# Patient Record
Sex: FEMALE | Race: WHITE
[De-identification: names, ages, dates, MRNs, and addresses within clinical notes are randomized per-mention and may not be internally consistent; named-entity substitution may affect disease eponyms.]

---

## 2017-12-24 ENCOUNTER — HOSPITAL ENCOUNTER (EMERGENCY)
Dept: HOSPITAL 72 - EMR | Age: 42
Discharge: HOME | End: 2017-12-24
Payer: COMMERCIAL

## 2017-12-24 VITALS — DIASTOLIC BLOOD PRESSURE: 71 MMHG | SYSTOLIC BLOOD PRESSURE: 112 MMHG

## 2017-12-24 VITALS — BODY MASS INDEX: 25.52 KG/M2 | WEIGHT: 130 LBS | HEIGHT: 60 IN

## 2017-12-24 DIAGNOSIS — J20.9: Primary | ICD-10-CM

## 2017-12-24 PROCEDURE — 94664 DEMO&/EVAL PT USE INHALER: CPT

## 2017-12-24 PROCEDURE — 99283 EMERGENCY DEPT VISIT LOW MDM: CPT

## 2017-12-24 PROCEDURE — 94640 AIRWAY INHALATION TREATMENT: CPT

## 2017-12-24 NOTE — EMERGENCY ROOM REPORT
History of Present Illness


General


Chief Complaint:  Pain


Source:  Patient





Present Illness


HPI


41 YO Female presents to the ED c/o nasal congestion/stuffy nose with 

persistent cough  x 4 days. pt. states her cough is worse at night . denies 

acid reflux. denies fevers or chills. reports hx of allergies otherwise denies 

asthma. reports that her voice has been "raspy". pt. denies recent travel or 

ill contacts. denies LE edema.  Denies CP, Palpitations, LOC, AMS, dizziness, 

Changes in Vision, Sensation, paresthesias, or a sudden severe headache.


Allergies:  


Coded Allergies:  


     No Known Allergies (Unverified , 2/9/15)





Patient History


Past Medical History:  see triage record


Past Surgical History:  none


Pertinent Family History:  none


Last Menstrual Period:  Nine days ago


Pregnant Now:  No


Reviewed Nursing Documentation:  PMH: Agreed, PSxH: Agreed





Review of Systems


All Other Systems:  negative except mentioned in HPI





Physical Exam





Vital Signs








  Date Time  Temp Pulse Resp B/P (MAP) Pulse Ox O2 Delivery O2 Flow Rate FiO2


 


12/24/17 15:18 98.1 80 16 116/72 99 Room Air  








Sp02 EP Interpretation:  reviewed, normal


General Appearance:  no apparent distress, alert, GCS 15, non-toxic


Head:  normocephalic, atraumatic


Eyes:  bilateral eye normal inspection, bilateral eye PERRL


ENT:  hearing grossly normal, normal pharynx, no angioedema, normal voice, TMs 

+ canals normal, uvula midline, moist mucus membranes, nasal congestion - mild 

bilateral nasal congestion with patent nares bialterally. no appreciable 

unilateral sinus ttp.


Neck:  full range of motion, no meningismus, supple/symm/no masses


Respiratory:  chest non-tender, lungs clear, normal breath sounds, speaking 

full sentences, wheezing - scant wheezing bilaterally


Cardiovascular #1:  regular rate, rhythm, no edema


Gastrointestinal:  non tender, soft


Rectal:  deferred


Musculoskeletal:  back normal, gait/station normal, normal range of motion, non-

tender


Neurologic:  alert, oriented x3, responsive, motor strength/tone normal, 

sensory intact, speech normal


Skin:  normal color, no rash, warm/dry, well hydrated


Lymphatic:  no adenopathy





Medical Decision Making


PA Attestation


 Dr. Madrid is my supervising Physician whom patient management has been 

discussed with.


Diagnostic Impression:  


 Primary Impression:  


 Cough


 Additional Impression:  


 Acute bronchitis


 Qualified Codes:  J20.9 - Acute bronchitis, unspecified


ER Course


41 YO Female presents to the ED c/o nasal congestion/stuffy nose with 

persistent cough  x 4 days. pt. states her cough is worse at night . denies 

acid reflux. denies fevers or chills. reports hx of allergies otherwise denies 

asthma. reports that her voice has been "raspy". pt. denies recent travel or 

ill contacts. denies LE edema.  Denies CP, Palpitations, LOC, AMS, dizziness, 

Changes in Vision, Sensation, paresthesias, or a sudden severe headache.





Ddx considered but are not limited to URI, pneumonia, PE, strep pharyngitis, 

meningitis.





Vital signs: Pt.is afebrile VS are WNL


H&PE are most consistent with bronchitis





ORDERS: none required at this time, the diagnosis is clinical





ED INTERVENTIONS: 





-Albuterol HHN





--d/w pt. the possibility that acid reflux may be cause of cough that is worse 

at night and laryngitis/raspy voice. 





DISCHARGE: At this time pt. is stable for d/c to home. Will provide printed 

patient care instructions, and any necessary prescriptions. Care plan and 

follow up instructions have been discussed with the patient prior to discharge.





Last Vital Signs








  Date Time  Temp Pulse Resp B/P (MAP) Pulse Ox O2 Delivery O2 Flow Rate FiO2


 


12/24/17 15:18 98.1 80 16 116/72 99 Room Air  








Disposition:  HOME, SELF-CARE


Condition:  Stable


Scripts


Albuterol Sulfate* (ALBUTEROL SULFATE MDI*) 8.5 Gm Hfa.aer.ad


2 PUFF INH Q6H, #1 INH 0 Refills


   Prov: Teresa Kilgore P.A.         12/24/17 


Ranitidine Hcl* (ZANTAC*) 150 Mg Tablet


150 MG ORAL TWICE A DAY, #30 TAB


   Prov: Teresa Kilgore P.A.         12/24/17 


Codeine/Promethazine Hcl* (PROMETHAZINE-CODEINE SYRUP*) 118 Ml Syrup


5 ML ORAL Q6H Y for For Cough, #120 ML 0 Refills


   Prov: Teresa Kilgore P.A.         12/24/17


Patient Instructions:  Acute Bronchitis, Easy-to-Read, Heartburn, Easy-to-Read, 

Shortness of Breath, Easy-to-Read





Additional Instructions:  


Take medications as directed. 


 ** Follow up with a Primary Care Provider in 3-5 days, even if your symptoms 

have resolved. ** 


--Please review list of primary care clinics, if you do not already have a 

primary care provider





Return sooner to ED if new symptoms occur, or current symptoms become worse. 


Do not drink alcohol, drive, or operate heavy machinery while taking Cough as 

this may cause drowsiness. 











- Please note that this Emergency Department Report was dictated using [employer] technology software, occasionally this can lead to 

erroneous entry secondary to interpretation by the dictation equipment.











Teresa Kilgore Dec 24, 2017 15:57

## 2018-01-09 ENCOUNTER — HOSPITAL ENCOUNTER (EMERGENCY)
Dept: HOSPITAL 72 - EMR | Age: 43
Discharge: HOME | End: 2018-01-09
Payer: COMMERCIAL

## 2018-01-09 VITALS — SYSTOLIC BLOOD PRESSURE: 118 MMHG | DIASTOLIC BLOOD PRESSURE: 72 MMHG

## 2018-01-09 VITALS — WEIGHT: 125 LBS | HEIGHT: 60.25 IN | BODY MASS INDEX: 24.22 KG/M2

## 2018-01-09 VITALS — SYSTOLIC BLOOD PRESSURE: 129 MMHG | DIASTOLIC BLOOD PRESSURE: 76 MMHG
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
## 2018-01-09 DIAGNOSIS — S00.93XA: Primary | ICD-10-CM
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
## 2018-01-09 DIAGNOSIS — Z23: ICD-10-CM

## 2018-01-09 DIAGNOSIS — S10.91XA: ICD-10-CM

## 2018-01-09 PROCEDURE — 90715 TDAP VACCINE 7 YRS/> IM: CPT

## 2018-01-09 PROCEDURE — 99283 EMERGENCY DEPT VISIT LOW MDM: CPT

## 2018-01-09 PROCEDURE — 90471 IMMUNIZATION ADMIN: CPT
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
## 2019-01-07 ENCOUNTER — HOSPITAL ENCOUNTER (EMERGENCY)
Dept: HOSPITAL 72 - EMR | Age: 44
Discharge: HOME | End: 2019-01-07
Payer: COMMERCIAL

## 2019-01-07 VITALS — HEIGHT: 60 IN | BODY MASS INDEX: 21.6 KG/M2 | WEIGHT: 110 LBS

## 2019-01-07 VITALS — DIASTOLIC BLOOD PRESSURE: 75 MMHG | SYSTOLIC BLOOD PRESSURE: 115 MMHG

## 2019-01-07 VITALS — SYSTOLIC BLOOD PRESSURE: 115 MMHG | DIASTOLIC BLOOD PRESSURE: 75 MMHG

## 2019-01-07 DIAGNOSIS — Z90.49: ICD-10-CM

## 2019-01-07 DIAGNOSIS — R11.0: ICD-10-CM

## 2019-01-07 DIAGNOSIS — R10.30: Primary | ICD-10-CM

## 2019-01-07 DIAGNOSIS — E78.5: ICD-10-CM

## 2019-01-07 LAB
ADD MANUAL DIFF: NO
ALBUMIN SERPL-MCNC: 4.3 G/DL (ref 3.4–5)
ALBUMIN/GLOB SERPL: 1 {RATIO} (ref 1–2.7)
ALP SERPL-CCNC: 70 U/L (ref 46–116)
ALT SERPL-CCNC: 15 U/L (ref 12–78)
ANION GAP SERPL CALC-SCNC: 10 MMOL/L (ref 5–15)
APPEARANCE UR: (no result)
APTT PPP: (no result) S
AST SERPL-CCNC: 15 U/L (ref 15–37)
BASOPHILS NFR BLD AUTO: 1.7 % (ref 0–2)
BILIRUB SERPL-MCNC: 0.2 MG/DL (ref 0.2–1)
BUN SERPL-MCNC: 16 MG/DL (ref 7–18)
CALCIUM SERPL-MCNC: 9.5 MG/DL (ref 8.5–10.1)
CHLORIDE SERPL-SCNC: 103 MMOL/L (ref 98–107)
CO2 SERPL-SCNC: 27 MMOL/L (ref 21–32)
CREAT SERPL-MCNC: 0.9 MG/DL (ref 0.55–1.3)
EOSINOPHIL NFR BLD AUTO: 1.1 % (ref 0–3)
ERYTHROCYTE [DISTWIDTH] IN BLOOD BY AUTOMATED COUNT: 11.1 % (ref 11.6–14.8)
GLOBULIN SER-MCNC: 4.2 G/DL
GLUCOSE UR STRIP-MCNC: NEGATIVE MG/DL
HCT VFR BLD CALC: 37.7 % (ref 37–47)
HGB BLD-MCNC: 12.9 G/DL (ref 12–16)
KETONES UR QL STRIP: (no result)
LEUKOCYTE ESTERASE UR QL STRIP: NEGATIVE
LYMPHOCYTES NFR BLD AUTO: 25.2 % (ref 20–45)
MCV RBC AUTO: 89 FL (ref 80–99)
MONOCYTES NFR BLD AUTO: 6.5 % (ref 1–10)
NEUTROPHILS NFR BLD AUTO: 65.5 % (ref 45–75)
NITRITE UR QL STRIP: NEGATIVE
PH UR STRIP: 7 [PH] (ref 4.5–8)
PLATELET # BLD: 297 K/UL (ref 150–450)
POTASSIUM SERPL-SCNC: 3.7 MMOL/L (ref 3.5–5.1)
PROT UR QL STRIP: (no result)
RBC # BLD AUTO: 4.25 M/UL (ref 4.2–5.4)
SODIUM SERPL-SCNC: 140 MMOL/L (ref 136–145)
SP GR UR STRIP: 1.01 (ref 1–1.03)
UROBILINOGEN UR-MCNC: 1 MG/DL (ref 0–1)
WBC # BLD AUTO: 6.8 K/UL (ref 4.8–10.8)

## 2019-01-07 PROCEDURE — 99284 EMERGENCY DEPT VISIT MOD MDM: CPT

## 2019-01-07 PROCEDURE — 74177 CT ABD & PELVIS W/CONTRAST: CPT

## 2019-01-07 PROCEDURE — 96361 HYDRATE IV INFUSION ADD-ON: CPT

## 2019-01-07 PROCEDURE — 83690 ASSAY OF LIPASE: CPT

## 2019-01-07 PROCEDURE — 96375 TX/PRO/DX INJ NEW DRUG ADDON: CPT

## 2019-01-07 PROCEDURE — 81025 URINE PREGNANCY TEST: CPT

## 2019-01-07 PROCEDURE — 36415 COLL VENOUS BLD VENIPUNCTURE: CPT

## 2019-01-07 PROCEDURE — 85025 COMPLETE CBC W/AUTO DIFF WBC: CPT

## 2019-01-07 PROCEDURE — 80053 COMPREHEN METABOLIC PANEL: CPT

## 2019-01-07 PROCEDURE — 96374 THER/PROPH/DIAG INJ IV PUSH: CPT

## 2019-01-07 PROCEDURE — 81003 URINALYSIS AUTO W/O SCOPE: CPT

## 2019-01-07 NOTE — EMERGENCY ROOM REPORT
History of Present Illness


General


Chief Complaint:  Abdominal Pain


Source:  Patient





Present Illness


HPI


Patient is a 43-year-old female presented after increased lower abdominal pain.

  Patient reports of increased pain periumbilical area as well as from her into 

the left lower quadrant.  Patient had prior history of cholecystectomy 

laparoscopically performed approximately 4 years ago.  She had not been 

vomiting but has been feeling increasingly nauseated.Patient works in the 

emergency department.She reportedly been having increased pain after eating.


Allergies:  


Coded Allergies:  


     No Known Allergies (Unverified , 1/7/19)





Patient History


Past Medical History:  see triage record


Last Menstrual Period:  12/31/18


Pregnant Now:  No


Reviewed Nursing Documentation:  PMH: Agreed; PSxH: Agreed





Nursing Documentation-PMH


Past Medical History:  No Stated History


Hx Cardiac Problems:  No - HYPERLIPIDEMIA





Review of Systems


All Other Systems:  negative except mentioned in HPI





Physical Exam





Vital Signs








  Date Time  Temp Pulse Resp B/P (MAP) Pulse Ox O2 Delivery O2 Flow Rate FiO2


 


1/7/19 18:50 98.2 90 16 118/82 96 Room Air  








Sp02 EP Interpretation:  reviewed, normal


General Appearance:  normal inspection, well appearing, no apparent distress, 

alert, GCS 15, non-toxic


Head:  atraumatic


ENT:  normal ENT inspection, hearing grossly normal, normal voice


Neck:  normal inspection, full range of motion, supple, no bony tend


Respiratory:  normal inspection, lungs clear, normal breath sounds, no 

respiratory distress, no retraction, no wheezing


Cardiovascular #1:  regular rate, rhythm, no edema


Gastrointestinal:  normal inspection, normal bowel sounds, non tender, soft, no 

guarding, no hernia


Genitourinary:  no CVA tenderness


Musculoskeletal:  normal inspection, back normal, normal range of motion


Neurologic:  normal inspection, alert, oriented x3, responsive, CNs III-XII nml 

as tested, speech normal


Psychiatric:  normal inspection, judgement/insight normal, mood/affect normal


Skin:  normal inspection, normal color, no rash





Medical Decision Making


ER Course


.  ..Patient presented for abdominal pain. Differential diagnoses included 

ischemic bowel, appendicitis, perforated viscus, abdominal aortic aneurysm, 

inferior myocardial infarction, viral gastroenteritis because of complexity of 

patient's case laboratory testing and imaging studies were ordered.Labs or 

studies were essentially unremarkable.  CT abdomen pelvis is ordered to the 

patient's severe pain.  CT abdomen pelvis read by radiology showed 

cholecystectomy with mild intra-hepatic duct dilation there is no evidence of 

GI or urinary tract obstruction there was a normal appendix as well as small 

fatty umbilical hernia





Labs








Test


  1/7/19


19:15 1/7/19


20:20


 


White Blood Count


  6.8 K/UL


(4.8-10.8) 


 


 


Red Blood Count


  4.25 M/UL


(4.20-5.40) 


 


 


Hemoglobin


  12.9 G/DL


(12.0-16.0) 


 


 


Hematocrit


  37.7 %


(37.0-47.0) 


 


 


Mean Corpuscular Volume 89 FL (80-99)  


 


Mean Corpuscular Hemoglobin


  30.4 PG


(27.0-31.0) 


 


 


Mean Corpuscular Hemoglobin


Concent 34.3 G/DL


(32.0-36.0) 


 


 


Red Cell Distribution Width


  11.1 %


(11.6-14.8) 


 


 


Platelet Count


  297 K/UL


(150-450) 


 


 


Mean Platelet Volume


  6.8 FL


(6.5-10.1) 


 


 


Neutrophils (%) (Auto)


  65.5 %


(45.0-75.0) 


 


 


Lymphocytes (%) (Auto)


  25.2 %


(20.0-45.0) 


 


 


Monocytes (%) (Auto)


  6.5 %


(1.0-10.0) 


 


 


Eosinophils (%) (Auto)


  1.1 %


(0.0-3.0) 


 


 


Basophils (%) (Auto)


  1.7 %


(0.0-2.0) 


 


 


Sodium Level


  140 MMOL/L


(136-145) 


 


 


Potassium Level


  3.7 MMOL/L


(3.5-5.1) 


 


 


Chloride Level


  103 MMOL/L


() 


 


 


Carbon Dioxide Level


  27 MMOL/L


(21-32) 


 


 


Anion Gap


  10 mmol/L


(5-15) 


 


 


Blood Urea Nitrogen


  16 mg/dL


(7-18) 


 


 


Creatinine


  0.9 MG/DL


(0.55-1.30) 


 


 


Estimat Glomerular Filtration


Rate > 60 mL/min


(>60) 


 


 


Glucose Level


  94 MG/DL


() 


 


 


Calcium Level


  9.5 MG/DL


(8.5-10.1) 


 


 


Total Bilirubin


  0.2 MG/DL


(0.2-1.0) 


 


 


Aspartate Amino Transf


(AST/SGOT) 15 U/L (15-37) 


  


 


 


Alanine Aminotransferase


(ALT/SGPT) 15 U/L (12-78) 


  


 


 


Alkaline Phosphatase


  70 U/L


() 


 


 


Total Protein


  8.5 G/DL


(6.4-8.2) 


 


 


Albumin


  4.3 G/DL


(3.4-5.0) 


 


 


Globulin 4.2 g/dL  


 


Albumin/Globulin Ratio 1.0 (1.0-2.7)  


 


Lipase


  172 U/L


() 


 


 


Urine Color  Pale yellow 


 


Urine Appearance


  


  Slightly


cloudy


 


Urine pH  7 (4.5-8.0) 


 


Urine Specific Gravity


  


  1.015


(1.005-1.035)


 


Urine Protein  1+ (NEGATIVE) 


 


Urine Glucose (UA)


  


  Negative


(NEGATIVE)


 


Urine Ketones  1+ (NEGATIVE) 


 


Urine Blood


  


  Negative


(NEGATIVE)


 


Urine Nitrite


  


  Negative


(NEGATIVE)


 


Urine Bilirubin


  


  Negative


(NEGATIVE)


 


Urine Urobilinogen


  


  1 MG/DL


(0.0-1.0)


 


Urine Leukocyte Esterase


  


  Negative


(NEGATIVE)


 


Urine RBC


  


  0-2 /HPF (0 -


2)


 


Urine WBC


  


  2-4 /HPF (0 -


2)


 


Urine Squamous Epithelial


Cells 


  Many /LPF


(NONE/OCC)


 


Urine Amorphous Sediment


  


  Many /LPF


(NONE)


 


Urine Bacteria


  


  Few /HPF


(NONE)


 


Urine HCG, Qualitative


  


  Negative


(NEGATIVE)











Last Vital Signs








  Date Time  Temp Pulse Resp B/P (MAP) Pulse Ox O2 Delivery O2 Flow Rate FiO2


 


1/7/19 19:15 98.2 79 16 115/75 96 Room Air  








Status:  improved


Disposition:  HOME, SELF-CARE


Condition:  Stable


Referrals:  


NON PHYSICIAN (PCP)











Nolan Rajput MD Jan 7, 2019 22:03

## 2019-01-07 NOTE — NUR
ED Nurse Note:



Pt came in the ER from home due to abdominal pain around umbilical area x24 hrs 
10/10 erwin. No complaint of n/v/d. AOx4, Vss erwin. Will cont to monitor.

## 2019-01-07 NOTE — NUR
ED Nurse Note:

Patient cleared for discharge per ERMD. AO4. NAD. VSS. Patent given 
prescriptions and discharge instructions; veralized understanding. IV & ID band 
removed. Patient ambulated out with all personal belongings with steady gait.

## 2019-01-08 NOTE — DIAGNOSTIC IMAGING REPORT
Indication: Abdominal pain

 

Technique: Continuous helical transaxial imaging of the abdomen and pelvis was

obtained from the lung bases to the pubic symphysis during intravenous contrast

administration. Coronal 2-D reformats were also obtained. Study obtained in a Siemens

sensation 64 slice CT.  Automatic Exposure Control was utilized.

 

Total Dose length Product (DLP):  578.52 mGycm

 

CT Dose Index Volume (CTDIvol):   10.92 mGy

 

Comparison: None

 

Findings: Lung bases are clear. The liver and spleen are unremarkable.

Cholecystectomy noted. Pancreas is unremarkable. There is no adrenal mass. No

hydronephrosis identified. Bladder is nondistended. The uterus is noted. Appendix may

be partially visualized. There are no secondary signs of acute appendicitis.

 

IMPRESSION:

 

No acute findings appreciated.

 

Status post cholecystectomy.

 

[facility] has communicated the preliminary results to the Emergency

Department.  Their findings are largely concordant with this report.

 

 

 

The CT scanner at [facility] is accredited by the [employer] College of

Radiology and the scans are performed using dose optimization techniques as

appropriate to a performed exam including Automatic Exposure control.

## 2019-01-08 NOTE — NUR
ED Nurse Note:

Received report from FIORELLA Sahu. Pt AO4. NAD. VSSJayson BEAN.

-------------------------------------------------------------------------------

Addendum: 01/08/19 at 0133 by LCRISOSTOM

-------------------------------------------------------------------------------

Wrong time